# Patient Record
Sex: FEMALE | Race: WHITE | URBAN - METROPOLITAN AREA
[De-identification: names, ages, dates, MRNs, and addresses within clinical notes are randomized per-mention and may not be internally consistent; named-entity substitution may affect disease eponyms.]

---

## 2017-10-26 ENCOUNTER — OFFICE VISIT (OUTPATIENT)
Dept: ORTHOPEDIC SURGERY | Age: 37
End: 2017-10-26

## 2017-10-26 VITALS
WEIGHT: 155 LBS | DIASTOLIC BLOOD PRESSURE: 71 MMHG | BODY MASS INDEX: 22.19 KG/M2 | HEIGHT: 70 IN | HEART RATE: 72 BPM | SYSTOLIC BLOOD PRESSURE: 110 MMHG

## 2017-10-26 DIAGNOSIS — M75.41 SHOULDER IMPINGEMENT, RIGHT: ICD-10-CM

## 2017-10-26 DIAGNOSIS — M25.511 RIGHT SHOULDER PAIN, UNSPECIFIED CHRONICITY: ICD-10-CM

## 2017-10-26 DIAGNOSIS — M77.11 RIGHT LATERAL EPICONDYLITIS: ICD-10-CM

## 2017-10-26 DIAGNOSIS — M75.81 TENDINITIS OF RIGHT ROTATOR CUFF: ICD-10-CM

## 2017-10-26 DIAGNOSIS — M25.521 RIGHT ELBOW PAIN: Primary | ICD-10-CM

## 2017-10-26 PROCEDURE — G8484 FLU IMMUNIZE NO ADMIN: HCPCS | Performed by: FAMILY MEDICINE

## 2017-10-26 PROCEDURE — 99203 OFFICE O/P NEW LOW 30 MIN: CPT | Performed by: FAMILY MEDICINE

## 2017-10-26 PROCEDURE — G8427 DOCREV CUR MEDS BY ELIG CLIN: HCPCS | Performed by: FAMILY MEDICINE

## 2017-10-26 PROCEDURE — G8420 CALC BMI NORM PARAMETERS: HCPCS | Performed by: FAMILY MEDICINE

## 2017-10-26 PROCEDURE — MISCD86 TENNIS ELBOW STRAP-BREG: Performed by: FAMILY MEDICINE

## 2017-10-26 PROCEDURE — 1036F TOBACCO NON-USER: CPT | Performed by: FAMILY MEDICINE

## 2017-10-26 RX ORDER — METHYLPREDNISOLONE 4 MG/1
TABLET ORAL
Qty: 1 KIT | Refills: 0 | Status: SHIPPED | OUTPATIENT
Start: 2017-10-26

## 2017-10-26 RX ORDER — IBUPROFEN 800 MG/1
800 TABLET ORAL 3 TIMES DAILY
Qty: 90 TABLET | Refills: 3 | Status: SHIPPED | OUTPATIENT
Start: 2017-10-26

## 2017-10-26 RX ORDER — IBUPROFEN 800 MG/1
800 TABLET ORAL EVERY 6 HOURS PRN
COMMUNITY

## 2019-12-11 NOTE — PROGRESS NOTES
58 y.o. female HLD, hypertriglyceridemias, unsure of last tetanus, glasses no contacts for distance only at night coming in with pain and redness to the left eye after hitting her eye with an envelope at work about 30 min ago.  No other injury, no visual changes.  Pain is about a 2/10 and nothing makes it better or worse including eye movement.  Pain is described as achy. with abduction beyond 120 and forward flexion beyond 1:30. Internal rotation produces some lateral shoulder discomfort to T10. Mild stiffness at terminal 10° of external rotation. She does have 4-4+ out of 5 strength with supra-and infraspinatus testing. She does have a moderately positive impingement test.  No evidence of shoulder instability. Negative labral testing negative speed's testing. Negative screening cervical testing. Additional Examinations:     Contralateral Exam: Examination of the left shoulder reveals no atrophy or deformity. The skin is warm and dry. Range of motion is within normal limits. There is no focal tenderness with palpation. No AC joint tenderness. Negative Neer's and Villanueva-Corby exams. Strength is graded 5/5 throughout. Examination of the left elbow reveals warm, dry skin. Range of motion of the elbow and forearm is within normal limits. There is no tenderness of the medial or lateral epicondyles, olecranon, or radial head. Elbow and forearm strength is 5/5. Right Upper Extremity:  Examination of the right upper extremity does not show any tenderness, deformity or injury. Range of motion is unremarkable. There is no gross instability. There are no rashes, ulcerations or lesions. Strength and tone are normal.  Left Upper Extremity: Examination of the left upper extremity does not show any tenderness, deformity or injury. Range of motion is unremarkable. There is no gross instability. There are no rashes, ulcerations or lesions. Strength and tone are normal.  Neck: Examination of the neck does not show any tenderness, deformity or injury. Range of motion is unremarkable. There is no gross instability. There are no rashes, ulcerations or lesions.   Strength and tone are normal.        Diagnostic Test Findings:   Right shoulder true AP outlet and axillary films were obtained today and does show mild degenerative changes to the right a.c. joint with slight downsloping to the acromion. No glenohumeral degenerative changes or soft tissue calcification is noted. Right elbow AP and lateral does not show evidence of osseous injury or lateral epicondylar calcification. Assessment:  #1.  5 months status post right elbow pain with unrehabiloitated right lateral epicondylitis   #2.  5 months status post right shoulder pain with unrehabilitated right shoulder rotator cuff tendinitis with impingement and low-grade a.c. arthropathy    Impression:     Encounter Diagnoses   Name Primary?  Right elbow pain Yes    Right shoulder pain, unspecified chronicity     Tendinitis of right rotator cuff     Shoulder impingement, right     Right lateral epicondylitis        Office Procedures:     Orders Placed This Encounter   Procedures    TENNIS ELBOW STRAP-BREG     Patient was supplied a Breg Tennis Elbow Strap. This retail item was supplied to provide functional support and assist in protecting the affected area. Verbal and written instructions for the use of and application of this item were provided. The patient was educated and fit by a healthcare professional with expert knowledge and specialization in brace application. They were instructed to contact the office immediately should the equipment result in increased pain, decreased sensation, increased swelling or worsening of the condition.  XR SHOULDER RIGHT (MIN 2 VIEWS)     3V Rt Shoulder     Order Specific Question:   Reason for exam:     Answer:   Shoulder Pain    XR ELBOW RIGHT (2 VIEWS)     2V Rt Elbow     Order Specific Question:   Reason for exam:     Answer:   Elbow Pain    [facility]     Referral Priority:   Routine     Referral Type:   Eval and Treat     Requested Specialty:   Physical Therapy     Number of Visits Requested:   1       Treatment Plan:  Treatment options were discussed Bernadette Ryan. We did review her plain films and exam findings.   She has